# Patient Record
Sex: MALE | Race: WHITE | NOT HISPANIC OR LATINO | Employment: OTHER | URBAN - METROPOLITAN AREA
[De-identification: names, ages, dates, MRNs, and addresses within clinical notes are randomized per-mention and may not be internally consistent; named-entity substitution may affect disease eponyms.]

---

## 2017-02-10 ENCOUNTER — OFFICE VISIT (OUTPATIENT)
Dept: FAMILY MEDICINE | Facility: CLINIC | Age: 77
End: 2017-02-10
Payer: MEDICARE

## 2017-02-10 VITALS
OXYGEN SATURATION: 98 % | HEIGHT: 68 IN | BODY MASS INDEX: 22.19 KG/M2 | SYSTOLIC BLOOD PRESSURE: 140 MMHG | RESPIRATION RATE: 18 BRPM | DIASTOLIC BLOOD PRESSURE: 80 MMHG | WEIGHT: 146.38 LBS | HEART RATE: 61 BPM

## 2017-02-10 DIAGNOSIS — I10 ESSENTIAL HYPERTENSION: Primary | Chronic | ICD-10-CM

## 2017-02-10 PROCEDURE — 1126F AMNT PAIN NOTED NONE PRSNT: CPT | Mod: S$GLB,,, | Performed by: NURSE PRACTITIONER

## 2017-02-10 PROCEDURE — 99213 OFFICE O/P EST LOW 20 MIN: CPT | Mod: S$GLB,,, | Performed by: NURSE PRACTITIONER

## 2017-02-10 PROCEDURE — 1159F MED LIST DOCD IN RCRD: CPT | Mod: S$GLB,,, | Performed by: NURSE PRACTITIONER

## 2017-02-10 PROCEDURE — 1157F ADVNC CARE PLAN IN RCRD: CPT | Mod: S$GLB,,, | Performed by: NURSE PRACTITIONER

## 2017-02-10 PROCEDURE — 99999 PR PBB SHADOW E&M-EST. PATIENT-LVL IV: CPT | Mod: PBBFAC,,, | Performed by: NURSE PRACTITIONER

## 2017-02-10 PROCEDURE — 3077F SYST BP >= 140 MM HG: CPT | Mod: S$GLB,,, | Performed by: NURSE PRACTITIONER

## 2017-02-10 PROCEDURE — 99499 UNLISTED E&M SERVICE: CPT | Mod: S$GLB,,, | Performed by: NURSE PRACTITIONER

## 2017-02-10 PROCEDURE — 3079F DIAST BP 80-89 MM HG: CPT | Mod: S$GLB,,, | Performed by: NURSE PRACTITIONER

## 2017-02-10 PROCEDURE — 1160F RVW MEDS BY RX/DR IN RCRD: CPT | Mod: S$GLB,,, | Performed by: NURSE PRACTITIONER

## 2017-02-10 RX ORDER — LISINOPRIL 20 MG/1
20 TABLET ORAL DAILY
Qty: 30 TABLET | Refills: 0 | Status: SHIPPED | OUTPATIENT
Start: 2017-02-10 | End: 2017-03-15 | Stop reason: SDUPTHER
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
## 2017-02-10 NOTE — MR AVS SNAPSHOT
[facility]  [address] [facility] [address]  [place] [address]  Phone: [phone]  Fax: [phone]                  Mukesh Guevara JrRambo   2/10/2017 1:20 PM   Office Visit    Description:  Male : 1940   Provider:  Aleida Wick NP   Department:  [facility]           Reason for Visit     Hypertension           Diagnoses this Visit        Comments    Essential hypertension    -  Primary            To Do List           Future Appointments        Provider Department Dept Phone    2017 2:00 PM Christina Justin NP [facility] [phone]      Goals (5 Years of Data)              Today    16    Blood Pressure < 130/80   140/80  144/62  136/74      Follow-Up and Disposition     Return in about 1 week (around 2017) for Bp recehck.       These Medications        Disp Refills Start End    lisinopril (PRINIVIL,ZESTRIL) 20 MG tablet 30 tablet 0 2/10/2017     Take 1 tablet (20 mg total) by mouth once daily. - Oral    Pharmacy: [facility]/pharmacy #[id number] - [place], [place] - [address] #: [phone]         [facility] On Call     [facility] On Call Nurse Care Line -  Assistance  Registered nurses in the [facility] provide clinical advisement, health education, appointment booking, and other advisory services.  Call for this free service at [phone].             Medications           Message regarding Medications     Verify the changes and/or additions to your medication regime listed below are the same as discussed with your clinician today.  If any of these changes or additions are incorrect, please notify your healthcare provider.        CHANGE how you are taking these medications     Start Taking Instead of    lisinopril (PRINIVIL,ZESTRIL) 20 MG tablet lisinopril 10 MG tablet    Dosage:  Take 1 tablet (20 mg total) by mouth once daily. Dosage:  TAKE 1 TABLET EVERY DAY    Reason for Change:  " Reorder            Verify that the below list of medications is an accurate representation of the medications you are currently taking.  If none reported, the list may be blank. If incorrect, please contact your healthcare provider. Carry this list with you in case of emergency.           Current Medications     aspirin 81 MG Chew Take 81 mg by mouth once daily.    hydrochlorothiazide (HYDRODIURIL) 25 MG tablet TAKE 1/2 TABLET ONCE A DAY    ketoconazole (NIZORAL) 2 % shampoo Wash hair with medicated shampoo at least 3x/week - let sit on scalp at least 5 minutes prior to rinsing    lisinopril (PRINIVIL,ZESTRIL) 20 MG tablet Take 1 tablet (20 mg total) by mouth once daily.    mouthwashes Soln PHARMACIST:  MIX 50mL Benadryl Elixir; 50mL Viscous Lidocaine; 50mL Nystatin Suspension; 50mL Milk of Magnesia  PATIENT:  Take 5mL by mouth - Swish and swallow - every 4 hours as needed for mouth/throat pain.    pazopanib 200 mg Tab Take 800 mg by mouth once daily.    prasterone, dhea, 50 mg Cap Take by mouth once daily. For testosterone    prochlorperazine (COMPAZINE) 10 MG tablet Take 1 tablet (10 mg total) by mouth every 6 (six) hours as needed.    propranolol (INDERAL LA) 80 MG 24 hr capsule Take 1 capsule (80 mg total) by mouth once daily.    RESVERATROL ORAL Take by mouth.    testosterone undecanoate (AVEED) 750 mg/3 mL (250 mg/mL) Soln Inject 750 mg into the muscle every 10 weeks.    zinc 25 mg Tab Take by mouth once daily. Helps his testosterone           Clinical Reference Information           Your Vitals Were     BP Pulse Resp Height Weight SpO2    140/80 (BP Location: Right arm, Patient Position: Sitting, BP Method: Manual) 61 18 5' 8" (1.727 m) 66.4 kg (146 lb 6.2 oz) 98%    BMI                22.26 kg/m2          Blood Pressure          Most Recent Value    BP  (!)  140/80      Allergies as of 2/10/2017     Doxycycline    Penicillins      Immunizations Administered on Date of Encounter - 2/10/2017     None    "   [facility] Sign-Up     Activating your [facility] account is as easy as 1-2-3!     1) Visit [email or web address], select Sign Up Now, enter this activation code and your date of birth, then select Next.  [email or web address]  Expires: 3/27/2017  2:18 PM      2) Create a username and password to use when you visit [facility] in the future and select a security question in case you lose your password and select Next.    3) Enter your e-mail address and click Sign Up!    Additional Information  If you have questions, please e-mail [email or web address] or call [phone] to talk to our [facility] staff. Remember, [facility] is NOT to be used for urgent needs. For medical emergencies, dial 911.         Language Assistance Services     ATTENTION: Language assistance services are available, free of charge. Please call [phone].      ATENCIÓN: Si habla hansa, tiene a paniagua disposición servicios gratuitos de asistencia lingüística. Llame al [phone].     JONA Ý: N?u b?n nói Ti?ng Vi?t, có các d?ch v? h? tr? ngôn ng? mi?n phí dành cho b?n. G?i s? [phone].         [facility] complies with applicable Federal civil rights laws and does not discriminate on the basis of race, color, national origin, age, disability, or sex.

## 2017-02-17 ENCOUNTER — OFFICE VISIT (OUTPATIENT)
Dept: DERMATOLOGY | Facility: CLINIC | Age: 77
End: 2017-02-17
Payer: MEDICARE

## 2017-02-17 DIAGNOSIS — C44.311 BASAL CELL CARCINOMA OF NOSE: Primary | ICD-10-CM

## 2017-02-17 PROCEDURE — 1126F AMNT PAIN NOTED NONE PRSNT: CPT | Mod: S$GLB,,, | Performed by: DERMATOLOGY

## 2017-02-17 PROCEDURE — 1157F ADVNC CARE PLAN IN RCRD: CPT | Mod: S$GLB,,, | Performed by: DERMATOLOGY

## 2017-02-17 PROCEDURE — 1159F MED LIST DOCD IN RCRD: CPT | Mod: S$GLB,,, | Performed by: DERMATOLOGY

## 2017-02-17 PROCEDURE — 99213 OFFICE O/P EST LOW 20 MIN: CPT | Mod: S$GLB,,, | Performed by: DERMATOLOGY

## 2017-02-17 PROCEDURE — 99999 PR PBB SHADOW E&M-EST. PATIENT-LVL III: CPT | Mod: PBBFAC,,, | Performed by: DERMATOLOGY

## 2017-02-17 PROCEDURE — 1160F RVW MEDS BY RX/DR IN RCRD: CPT | Mod: S$GLB,,, | Performed by: DERMATOLOGY
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
## 2017-02-20 ENCOUNTER — TELEPHONE (OUTPATIENT)
Dept: OTOLARYNGOLOGY | Facility: CLINIC | Age: 77
End: 2017-02-20

## 2017-02-21 ENCOUNTER — TELEPHONE (OUTPATIENT)
Dept: DERMATOLOGY | Facility: CLINIC | Age: 77
End: 2017-02-21

## 2017-03-15 DIAGNOSIS — I10 ESSENTIAL HYPERTENSION: Chronic | ICD-10-CM

## 2017-03-15 RX ORDER — LISINOPRIL 20 MG/1
TABLET ORAL
Qty: 30 TABLET | Refills: 0 | Status: SHIPPED | OUTPATIENT
Start: 2017-03-15 | End: 2017-07-03 | Stop reason: SDUPTHER

## 2017-03-17 ENCOUNTER — OFFICE VISIT (OUTPATIENT)
Dept: OTOLARYNGOLOGY | Facility: CLINIC | Age: 77
End: 2017-03-17
Payer: MEDICARE

## 2017-03-17 VITALS
HEIGHT: 69 IN | BODY MASS INDEX: 21.42 KG/M2 | SYSTOLIC BLOOD PRESSURE: 124 MMHG | HEART RATE: 64 BPM | WEIGHT: 144.63 LBS | DIASTOLIC BLOOD PRESSURE: 74 MMHG

## 2017-03-17 DIAGNOSIS — C44.91 BASAL CELL CARCINOMA OF SKIN: Primary | ICD-10-CM

## 2017-03-17 PROCEDURE — 99999 PR PBB SHADOW E&M-EST. PATIENT-LVL III: CPT | Mod: PBBFAC,,, | Performed by: OTOLARYNGOLOGY

## 2017-03-17 PROCEDURE — 3078F DIAST BP <80 MM HG: CPT | Mod: S$GLB,,, | Performed by: OTOLARYNGOLOGY

## 2017-03-17 PROCEDURE — 1160F RVW MEDS BY RX/DR IN RCRD: CPT | Mod: S$GLB,,, | Performed by: OTOLARYNGOLOGY

## 2017-03-17 PROCEDURE — 3074F SYST BP LT 130 MM HG: CPT | Mod: S$GLB,,, | Performed by: OTOLARYNGOLOGY

## 2017-03-17 PROCEDURE — 99214 OFFICE O/P EST MOD 30 MIN: CPT | Mod: S$GLB,,, | Performed by: OTOLARYNGOLOGY

## 2017-03-17 PROCEDURE — 1126F AMNT PAIN NOTED NONE PRSNT: CPT | Mod: S$GLB,,, | Performed by: OTOLARYNGOLOGY

## 2017-03-17 PROCEDURE — 1159F MED LIST DOCD IN RCRD: CPT | Mod: S$GLB,,, | Performed by: OTOLARYNGOLOGY

## 2017-03-17 PROCEDURE — 1157F ADVNC CARE PLAN IN RCRD: CPT | Mod: S$GLB,,, | Performed by: OTOLARYNGOLOGY
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
## 2017-03-17 NOTE — LETTER
March 17, 2017      Renny Pérez MD  [address]  [facility] [address]           [facility] Otolaryngology  [address] [facility] [address]  [facility] [address]  Phone: [phone]  Fax: [phone]          Patient: Mukesh Guevara Jr.   MR Number: [id number]   YOB: 1940   Date of Visit: 3/17/2017       Dear Dr. Renny Pérez:    Thank you for referring Mukesh Guevara to me for evaluation. Attached you will find relevant portions of my assessment and plan of care.    If you have questions, please do not hesitate to call me. I look forward to following Mukesh Guevara along with you.    Sincerely,    Eddie Chan MD    Enclosure  CC:  No Recipients    If you would like to receive this communication electronically, please contact [email or web address] or [phone] to request more information on [facility] access.    For providers and/or their staff who would like to refer a patient to [facility], please contact us through our one-stop-shop provider referral line, [facility], at [phone].    If you feel you have received this communication in error or would no longer like to receive these types of communications, please e-mail [email or web address]

## 2017-03-17 NOTE — MR AVS SNAPSHOT
[facility] Otolaryngology  [address] [facility] [address]  [place] [address]  Phone: [phone]  Fax: [phone]                  Mukesh Guevara Jr.   3/17/2017 2:00 PM   Office Visit    Description:  Male : 1940   Provider:  Eddie Chan MD   Department:  [facility] Otolaryngology           Reason for Visit     Skin Cancer           Diagnoses this Visit        Comments    Basal cell carcinoma of skin    -  Primary            To Do List           Future Appointments        Provider Department Dept Phone    3/24/2017 2:20 PM Christina Justin NP [facility] [phone]      Goals (5 Years of Data)              Today    2/10/17    12/5/16    Blood Pressure < 130/80   124/74  124/74  124/74      Follow-Up and Disposition     Return if symptoms worsen or fail to improve.      [facility]     [facility] On Call Nurse Care Line -  Assistance  Registered nurses in the [facility] provide clinical advisement, health education, appointment booking, and other advisory services.  Call for this free service at [phone].             Medications           Message regarding Medications     Verify the changes and/or additions to your medication regime listed below are the same as discussed with your clinician today.  If any of these changes or additions are incorrect, please notify your healthcare provider.             Verify that the below list of medications is an accurate representation of the medications you are currently taking.  If none reported, the list may be blank. If incorrect, please contact your healthcare provider. Carry this list with you in case of emergency.           Current Medications     aspirin 81 MG Chew Take 81 mg by mouth once daily.    hydrochlorothiazide (HYDRODIURIL) 25 MG tablet TAKE 1/2 TABLET ONCE A DAY    ketoconazole (NIZORAL) 2 % shampoo Wash hair with medicated shampoo at least 3x/week - let sit on scalp at least 5 minutes prior to rinsing     "lisinopril (PRINIVIL,ZESTRIL) 20 MG tablet TAKE 1 TABLET (20 MG TOTAL) BY MOUTH ONCE DAILY.    mouthwashes Soln PHARMACIST:  MIX 50mL Benadryl Elixir; 50mL Viscous Lidocaine; 50mL Nystatin Suspension; 50mL Milk of Magnesia  PATIENT:  Take 5mL by mouth - Swish and swallow - every 4 hours as needed for mouth/throat pain.    pazopanib 200 mg Tab Take 800 mg by mouth once daily.    prasterone, dhea, 50 mg Cap Take by mouth once daily. For testosterone    prochlorperazine (COMPAZINE) 10 MG tablet Take 1 tablet (10 mg total) by mouth every 6 (six) hours as needed.    RESVERATROL ORAL Take by mouth.    testosterone undecanoate (AVEED) 750 mg/3 mL (250 mg/mL) Soln Inject 750 mg into the muscle every 10 weeks.    zinc 25 mg Tab Take by mouth once daily. Helps his testosterone    propranolol (INDERAL LA) 80 MG 24 hr capsule Take 1 capsule (80 mg total) by mouth once daily.           Clinical Reference Information           Your Vitals Were     BP Pulse Height Weight BMI    124/74 64 5' 8.5" (1.74 m) 65.6 kg (144 lb 10 oz) 21.67 kg/m2      Blood Pressure          Most Recent Value    BP  124/74      Allergies as of 3/17/2017     Doxycycline    Penicillins      Immunizations Administered on Date of Encounter - 3/17/2017     None      Instructions    Dr. Pérez and I will be in touch about the potential for Mohs surgery and reconstruction.        Language Assistance Services     ATTENTION: Language assistance services are available, free of charge. Please call [phone].      ATENCIÓN: Si cliff santo, tiene a paniagua disposición servicios gratuitos de asistencia lingüística. Llame al [phone].     [facility] Ý: N?u b?n nói Ti?ng Vi?t, có các d?ch v? h? tr? ngôn ng? mi?n phí dành cho b?n. G?i s? [phone].         [facility] Ot[facility] complies with applicable Federal civil rights laws and does not discriminate on the basis of race, color, national origin, age, disability, or sex.        "

## 2017-03-18 DIAGNOSIS — R25.1 TREMOR: ICD-10-CM

## 2017-03-21 RX ORDER — PROPRANOLOL HYDROCHLORIDE 80 MG/1
CAPSULE, EXTENDED RELEASE ORAL
Qty: 30 CAPSULE | Refills: 6 | Status: SHIPPED | OUTPATIENT
Start: 2017-03-21 | End: 2017-07-05 | Stop reason: SDUPTHER

## 2017-03-24 ENCOUNTER — OFFICE VISIT (OUTPATIENT)
Dept: FAMILY MEDICINE | Facility: CLINIC | Age: 77
End: 2017-03-24
Payer: MEDICARE

## 2017-03-24 VITALS
TEMPERATURE: 99 F | SYSTOLIC BLOOD PRESSURE: 116 MMHG | HEIGHT: 68 IN | BODY MASS INDEX: 22.05 KG/M2 | HEART RATE: 68 BPM | WEIGHT: 145.5 LBS | RESPIRATION RATE: 16 BRPM | OXYGEN SATURATION: 95 % | DIASTOLIC BLOOD PRESSURE: 74 MMHG

## 2017-03-24 DIAGNOSIS — I10 ESSENTIAL HYPERTENSION: Primary | ICD-10-CM

## 2017-03-24 PROCEDURE — 1159F MED LIST DOCD IN RCRD: CPT | Mod: S$GLB,,, | Performed by: NURSE PRACTITIONER

## 2017-03-24 PROCEDURE — 3078F DIAST BP <80 MM HG: CPT | Mod: S$GLB,,, | Performed by: NURSE PRACTITIONER

## 2017-03-24 PROCEDURE — 1157F ADVNC CARE PLAN IN RCRD: CPT | Mod: S$GLB,,, | Performed by: NURSE PRACTITIONER

## 2017-03-24 PROCEDURE — 3074F SYST BP LT 130 MM HG: CPT | Mod: S$GLB,,, | Performed by: NURSE PRACTITIONER

## 2017-03-24 PROCEDURE — 99213 OFFICE O/P EST LOW 20 MIN: CPT | Mod: S$GLB,,, | Performed by: NURSE PRACTITIONER

## 2017-03-24 PROCEDURE — 99499 UNLISTED E&M SERVICE: CPT | Mod: S$GLB,,, | Performed by: NURSE PRACTITIONER

## 2017-03-24 PROCEDURE — 1160F RVW MEDS BY RX/DR IN RCRD: CPT | Mod: S$GLB,,, | Performed by: NURSE PRACTITIONER

## 2017-03-24 PROCEDURE — 99999 PR PBB SHADOW E&M-EST. PATIENT-LVL III: CPT | Mod: PBBFAC,,, | Performed by: NURSE PRACTITIONER

## 2017-03-24 NOTE — MR AVS SNAPSHOT
[facility]  [address]  [facility] [address]  Phone: [phone]  Fax: [phone]                  Mukesh Guevara    3/24/2017 2:20 PM   Office Visit    Description:  Male : 1940   Provider:  Christina Justin NP   Department:  [facility]           Reason for Visit     Hypertension           Diagnoses this Visit        Comments    Essential hypertension    -  Primary            To Do List           Future Appointments        Provider Department Dept Phone    2017 9:10 AM LAB, [place] [facility] [phone]    2017 7:45 AM Renny Pérez MD [facility] Dermatology [phone]      Goals (5 Years of Data)              Today    3/17/17    2/10/17    Blood Pressure < 130/80   116/74  116/74  116/74      Follow-Up and Disposition     Return in about 2 months (around 2017), or if symptoms worsen or fail to improve.      [facility]     [facility] Nurse Care [facility] -  Assistance  Registered nurses in the [facility] provide clinical advisement, health education, appointment booking, and other advisory services.  Call for this free service at [phone].             Medications           Message regarding Medications     Verify the changes and/or additions to your medication regime listed below are the same as discussed with your clinician today.  If any of these changes or additions are incorrect, please notify your healthcare provider.             Verify that the below list of medications is an accurate representation of the medications you are currently taking.  If none reported, the list may be blank. If incorrect, please contact your healthcare provider. Carry this list with you in case of emergency.           Current Medications     aspirin 81 MG Chew Take 81 mg by mouth once daily.    hydrochlorothiazide (HYDRODIURIL) 25 MG tablet TAKE 1/2 TABLET ONCE A DAY    ketoconazole (NIZORAL) 2 %  "shampoo Wash hair with medicated shampoo at least 3x/week - let sit on scalp at least 5 minutes prior to rinsing    lisinopril (PRINIVIL,ZESTRIL) 20 MG tablet TAKE 1 TABLET (20 MG TOTAL) BY MOUTH ONCE DAILY.    mouthwashes Soln PHARMACIST:  MIX 50mL Benadryl Elixir; 50mL Viscous Lidocaine; 50mL Nystatin Suspension; 50mL Milk of Magnesia  PATIENT:  Take 5mL by mouth - Swish and swallow - every 4 hours as needed for mouth/throat pain.    pazopanib 200 mg Tab Take 800 mg by mouth once daily.    prochlorperazine (COMPAZINE) 10 MG tablet Take 1 tablet (10 mg total) by mouth every 6 (six) hours as needed.    propranolol (INDERAL LA) 80 MG 24 hr capsule TAKE 1 CAPSULE (80 MG TOTAL) BY MOUTH ONCE DAILY.    RESVERATROL ORAL Take by mouth.    zinc 25 mg Tab Take by mouth once daily. Helps his testosterone    prasterone, dhea, 50 mg Cap Take by mouth once daily. For testosterone    testosterone undecanoate (AVEED) 750 mg/3 mL (250 mg/mL) Soln Inject 750 mg into the muscle every 10 weeks.           Clinical Reference Information           Your Vitals Were     BP Pulse Temp Resp Height Weight    116/74 68 98.9 °F (37.2 °C) (Oral) 16 5' 8" (1.727 m) 66 kg (145 lb 8.1 oz)    SpO2 BMI             95% 22.12 kg/m2         Blood Pressure          Most Recent Value    BP  116/74      Allergies as of 3/24/2017     Doxycycline    Penicillins      Immunizations Administered on Date of Encounter - 3/24/2017     None      Orders Placed During Today's Visit     Future Labs/Procedures Expected by Expires    CBC auto differential  3/24/2017 5/23/2018    Comprehensive metabolic panel  3/24/2017 6/22/2017    Lipid panel  3/24/2017 5/23/2018    TSH  3/24/2017 5/23/2018      Language Assistance Services     ATTENTION: Language assistance services are available, free of charge. Please call [phone].      ATENCIÓN: Si habla español, tiene a paniagua disposición servicios gratuitos de asistencia lingüística. Llame al [phone].     CHÚ Ý: N?u " b?n nói Ti?ng Vi?t, có các d?ch v? h? tr? ngôn ng? mi?n phí dành cho b?n. G?i s? [phone].         [facility] complies with applicable Federal civil rights laws and does not discriminate on the basis of race, color, national origin, age, disability, or sex.

## 2017-03-24 NOTE — PROGRESS NOTES
Subjective:       Patient ID: Mukesh Guevara Jr. is a 77 y.o. male.    Chief Complaint: Hypertension  He was last seen in primary care by HARRY Wick NP on 02/10/2017. This is his first time seeing me in the clinic.  HPI  Vitals:    03/24/17 1428   BP: 116/74   Pulse: 68   Resp: 16   Temp: 98.9 °F (37.2 °C)     BP Readings from Last 3 Encounters:   03/24/17 116/74   03/17/17 124/74   02/10/17 (!) 140/80     Review of Systems  States he is taking his blood pressure medication daily as ordered.  Objective:      Physical Exam   Constitutional: He is oriented to person, place, and time. Vital signs are normal. He appears well-developed and well-nourished.   HENT:   Head: Normocephalic and atraumatic.   Right Ear: External ear normal.   Left Ear: External ear normal.   Nose:       Mouth/Throat: Uvula is midline, oropharynx is clear and moist and mucous membranes are normal.       Eyes: Lids are normal.   Neck: Normal range of motion. Neck supple.   Cardiovascular: Normal rate and regular rhythm.    Pulmonary/Chest: Effort normal and breath sounds normal.   Abdominal: Soft.   Lymphadenopathy:        Head (right side): No submental, no submandibular, no tonsillar, no preauricular, no posterior auricular and no occipital adenopathy present.        Head (left side): No submental, no submandibular, no tonsillar, no preauricular, no posterior auricular and no occipital adenopathy present.   Neurological: He is alert and oriented to person, place, and time.   Skin: Skin is warm and dry. Lesion noted.        Psychiatric: He has a normal mood and affect. His speech is normal and behavior is normal. Judgment and thought content normal. Cognition and memory are normal.   Nursing note and vitals reviewed.      Assessment & Plan:       Essential hypertension  -     Lipid panel; Future; Expected date: 3/24/17  -     CBC auto differential; Future; Expected date: 3/24/17  -     Comprehensive metabolic panel; Future; Expected date:  3/24/17  -     TSH; Future; Expected date: 3/24/17    No changes to his medications today. Continue same dosage.      Return in about 2 months (around 5/24/2017), or if symptoms worsen or fail to improve.  Will follow up with Dr. Newman 2 months  Not interested in Zoster vaccine at this time

## 2017-03-31 DIAGNOSIS — C44.91 BASAL CELL CARCINOMA OF SKIN: Primary | ICD-10-CM

## 2017-03-31 RX ORDER — LIDOCAINE HYDROCHLORIDE 10 MG/ML
1 INJECTION, SOLUTION EPIDURAL; INFILTRATION; INTRACAUDAL; PERINEURAL ONCE
Status: CANCELLED | OUTPATIENT
Start: 2017-03-31 | End: 2017-03-31

## 2017-04-24 ENCOUNTER — TELEPHONE (OUTPATIENT)
Dept: DERMATOLOGY | Facility: CLINIC | Age: 77
End: 2017-04-24

## 2017-05-01 ENCOUNTER — TELEPHONE (OUTPATIENT)
Dept: DERMATOLOGY | Facility: CLINIC | Age: 77
End: 2017-05-01

## 2017-05-01 NOTE — TELEPHONE ENCOUNTER
----- Message from Tamara De La Garza sent at 5/1/2017 12:32 PM CDT -----  Contact: call pt wed afternoon [phone]  Did not hear from dr claros nurse,,, he is to see a plastic surgeon dr moreno, or dr lynch.. Please call pt . No appts in computer

## 2017-05-16 ENCOUNTER — TELEPHONE (OUTPATIENT)
Dept: DERMATOLOGY | Facility: CLINIC | Age: 77
End: 2017-05-16

## 2017-06-01 NOTE — PROGRESS NOTES
ALLERGIES:   Doxycycline and Penicillins      Current Outpatient Prescriptions:     aspirin 81 MG Chew, Take 81 mg by mouth once daily., Disp: , Rfl:     hydrochlorothiazide (HYDRODIURIL) 25 MG tablet, TAKE 1/2 TABLET ONCE A DAY, Disp: 45 tablet, Rfl: 3    ketoconazole (NIZORAL) 2 % shampoo, Wash hair with medicated shampoo at least 3x/week - let sit on scalp at least 5 minutes prior to rinsing, Disp: 120 mL, Rfl: 5    lisinopril (PRINIVIL,ZESTRIL) 20 MG tablet, TAKE 1 TABLET (20 MG TOTAL) BY MOUTH ONCE DAILY., Disp: 30 tablet, Rfl: 0    mouthwashes Soln, PHARMACIST:  MIX 50mL Benadryl Elixir; 50mL Viscous Lidocaine; 50mL Nystatin Suspension; 50mL Milk of Magnesia PATIENT:  Take 5mL by mouth - Swish and swallow - every 4 hours as needed for mouth/throat pain., Disp: 200 mL, Rfl: 6    prasterone, dhea, 50 mg Cap, Take by mouth once daily. For testosterone, Disp: , Rfl:     propranolol (INDERAL LA) 80 MG 24 hr capsule, TAKE 1 CAPSULE (80 MG TOTAL) BY MOUTH ONCE DAILY., Disp: 30 capsule, Rfl: 6    RESVERATROL ORAL, Take by mouth., Disp: , Rfl:     testosterone undecanoate (AVEED) 750 mg/3 mL (250 mg/mL) Soln, Inject 750 mg into the muscle every 10 weeks., Disp: 1 vial, Rfl: 0    zinc 25 mg Tab, Take by mouth once daily. Helps his testosterone, Disp: , Rfl:   -------------------------------------------------------------  PROCEDURE: Mohs' Micrographic Surgery    SITE: left supratip of the nose    INDICATION: basal cell carcinoma in an area at increased risk of recurrence    CASE NUMBER: [id number]      ANESTHETIC: 2.5 mL 1.5% Lidocaine with Epinephrine 1:200,000    SURGICAL PREP: Ethanol and Betadine    SURGEON: Renny Pérez MD    ASSISTANTS: Nico Rojo CST     PREOPERATIVE DIAGNOSIS: basal cell carcinoma    POSTOPERATIVE DIAGNOSIS: basal cell carcinoma    PATHOLOGIC DIAGNOSIS: basal cell carcinoma    STAGES OF MOHS' SURGERY PERFORMED: one    TUMOR-FREE PLANE ACHIEVED: yes    HEMOSTASIS: Hyfrecation      SPECIMENS: one (one in stage A)    INITIAL LESION SIZE: 1.5 x 1.7 cm    FINAL DEFECT SIZE: 1.8 x 1.8 cm    WOUND REPAIR/DISPOSITION: see below    NARRATIVE:    The patient is a 77 y.o.male referred by Rebecca Mace MD with a history of cancer on the nose which was biopsied - pathology accession #[id number], [facility]. Findings revealed basal cell carcinoma. He has a prior history of a surgery to this area by Dr. Menjivar in October of 2014 for a basal cell carcinoma. Examination revealed an ill-defined pearly/sclerotic plaque with focal ulceration on the left nose tip at the site of prior biopsy, which was confirmed by reference to the photograph taken at the previous patient visit. In light of the nature of this tumor and the location on the nose, Mohs' micrographic surgery was thought to be the most appropriate management choice, and this diagnosis is appropriate for treatment by Mohs' micrographic surgery.  I discussed it with the patient and he fully understands the aims, risks, alternatives, and possible complications, and elects to proceed.  There are no medical or surgical contraindications to the procedure.     Of note, Mr. Guevara has an underlying metastatic renal cell carcinoma, as previously documented in his chart    A signed informed consent was obtained.    PROCEDURE:  The patient was placed in the semi-recumbent position on the operating table in the Mohs' Surgery Suite. The area in question was thoroughly prepped with ethanol and Betadine. A sterile surgical marker was used to outline the clinically apparent margins of the involved area, and a narrow margin of normal-appearing skin. Reference marks were made at the periphery of the outlined area with the surgical marker. The proposed area of excision was measured and photographed. Local anesthesia of 1.5% Lidocaine with 1:200,000 epinephrine was administered.  The total volume of anesthetic used throughout this portion of the procedure  "was as documented above. The area was prepared and draped in the standard manner. All of the grossly identifiable area of clinically abnormal tissue and an underlying/peripheral layer was taken and processed by the Mohs' technique.  Hemostasis was obtained with the hyfrecator. Tissue was taken from any areas of residual marginal involvement (if present) and processed by the Mohs' technique in as many stages as needed until a tumor-free plane was achieved.    Colors of inks used in the reference nicks at epidermal margins (if present) and/or inking of non-epithelial edges, if applicable, is represented on the Mohs map as follows: solid lines represent red ink, dots represent blue ink, jagged lines represent black ink, curlicues represent green ink, "xxx" represents yellow ink.    The first Mohs' layer consisted of one section(s) with 9 slide(s) evaluated. Histology of the specimen(s) showed no residual tumor on multiple initial cuts; on the later cuts, an area of infiltrative basal cell carcinoma with some foci showing squamous differentiation to the inferior deep margin of the specimen became evident. Actual surgical margins were assessed as clear.    A total of one section(s) and 9 slide(s) were examined under the microscope via the Mohs technique.  A cancer free plane was reached after layer number one. Defect final size was as noted above.     The wound was covered with a nonadherent dressing between stages, and the patient allowed to wait in the waiting area during these periods. The final defect was photographed at the completion of the Mohs' procedure.    The patient was returned to the procedure room following completion of the Mohs' procedure and final slide review. He is scheduled to see Jameson Bauer MD for closure of the defect on Monday as previously arranged.    Final dressing consisted of Telfa, gauze and tape.    Estimated blood loss for the total procedure was less than 5 mL.    Total operative time " including tissue processing in the Mohs' laboratory and microscopic Mohs' frozen section slide review was 1 hour(s). Verbal and written wound care instructions were given to the patient, and he expressed understanding of these instructions. The patient tolerated the procedure well and left the operating room in good condition; he is to return in several weeks for followup.     Dr. Pérez's cell phone number was given to the patient with instructions to call prn with any problems.    Addendum:   He also complained of another skin growth  Location: under left arm  Duration: about 2 months  Quality: not tender    ROS:  : has history of renal carcinoma with lung metastases    Exam: there is an approximately 1.3 cm raised, dome-shaped, non-tender, red tumor to the skin of the left chest wall inferior to the axilla; this feels solid on palpation, with no underlying palpable mass    Assess: clinically most compatible with a metastatic nodule from his renal carcinoma    Plan: I discussed with him my impression regarding the lesion. I recommended that we defer any treatment for the moment so as not to interfere with his repair by Dr. Bauer next week. I subsequently spoke to Dr. Bauer by phone and we discussed this lesion. He may perform excisional biopsy at the time of his surgery next week.

## 2017-06-02 ENCOUNTER — PROCEDURE VISIT (OUTPATIENT)
Dept: DERMATOLOGY | Facility: CLINIC | Age: 77
End: 2017-06-02
Payer: MEDICARE

## 2017-06-02 VITALS
WEIGHT: 145 LBS | SYSTOLIC BLOOD PRESSURE: 141 MMHG | HEIGHT: 68 IN | DIASTOLIC BLOOD PRESSURE: 83 MMHG | HEART RATE: 69 BPM | BODY MASS INDEX: 21.98 KG/M2

## 2017-06-02 DIAGNOSIS — C44.311 BASAL CELL CARCINOMA OF NOSE: Primary | ICD-10-CM

## 2017-06-02 PROCEDURE — 99499 UNLISTED E&M SERVICE: CPT | Mod: S$GLB,,, | Performed by: DERMATOLOGY

## 2017-06-02 PROCEDURE — 17311 MOHS 1 STAGE H/N/HF/G: CPT | Mod: S$GLB,,, | Performed by: DERMATOLOGY

## 2017-06-05 ENCOUNTER — TELEPHONE (OUTPATIENT)
Dept: FAMILY MEDICINE | Facility: CLINIC | Age: 77
End: 2017-06-05

## 2017-06-05 NOTE — TELEPHONE ENCOUNTER
Attempted to call pt left message to call clinic in regards to his surgery that's due 6/6/2017 at [facility], nasal defect. Please advise.

## 2017-06-06 NOTE — TELEPHONE ENCOUNTER
----- Message from Jumana Mota sent at 6/6/2017  8:25 AM CDT -----  Patient states he is returning office call. Please call back with details at [phone].

## 2017-07-03 DIAGNOSIS — R25.1 TREMOR: ICD-10-CM

## 2017-07-03 DIAGNOSIS — I10 ESSENTIAL HYPERTENSION: Chronic | ICD-10-CM

## 2017-07-03 RX ORDER — LISINOPRIL 20 MG/1
TABLET ORAL
Qty: 30 TABLET | Refills: 0 | Status: SHIPPED | OUTPATIENT
Start: 2017-07-03 | End: 2017-08-28 | Stop reason: SDUPTHER

## 2017-07-05 RX ORDER — PROPRANOLOL HYDROCHLORIDE 80 MG/1
CAPSULE, EXTENDED RELEASE ORAL
Qty: 90 CAPSULE | Refills: 1 | Status: SHIPPED | OUTPATIENT
Start: 2017-07-05

## 2017-08-07 ENCOUNTER — HOSPITAL ENCOUNTER (OUTPATIENT)
Dept: RADIOLOGY | Facility: HOSPITAL | Age: 77
Discharge: HOME | End: 2017-08-07
Attending: NURSE PRACTITIONER
Payer: MEDICARE

## 2017-08-07 ENCOUNTER — OFFICE VISIT (OUTPATIENT)
Dept: FAMILY MEDICINE | Facility: CLINIC | Age: 77
End: 2017-08-07
Payer: MEDICARE

## 2017-08-07 VITALS
DIASTOLIC BLOOD PRESSURE: 83 MMHG | HEIGHT: 68 IN | SYSTOLIC BLOOD PRESSURE: 158 MMHG | WEIGHT: 144.81 LBS | BODY MASS INDEX: 21.95 KG/M2 | HEART RATE: 68 BPM

## 2017-08-07 DIAGNOSIS — I70.0 AORTIC ATHEROSCLEROSIS: Chronic | ICD-10-CM

## 2017-08-07 DIAGNOSIS — M25.561 CHRONIC PAIN OF RIGHT KNEE: ICD-10-CM

## 2017-08-07 DIAGNOSIS — Z00.00 ENCOUNTER FOR PREVENTIVE HEALTH EXAMINATION: Primary | ICD-10-CM

## 2017-08-07 DIAGNOSIS — C64.9 KIDNEY CANCER, PRIMARY, WITH METASTASIS FROM KIDNEY TO OTHER SITE, UNSPECIFIED LATERALITY: Chronic | ICD-10-CM

## 2017-08-07 DIAGNOSIS — Z86.010 PERSONAL HISTORY OF COLONIC POLYPS: ICD-10-CM

## 2017-08-07 DIAGNOSIS — G89.29 CHRONIC PAIN OF RIGHT KNEE: ICD-10-CM

## 2017-08-07 DIAGNOSIS — I10 ESSENTIAL HYPERTENSION: Chronic | ICD-10-CM

## 2017-08-07 DIAGNOSIS — Z85.828 HISTORY OF BASAL CELL CARCINOMA: ICD-10-CM

## 2017-08-07 DIAGNOSIS — Z85.528 PERSONAL HISTORY OF RENAL CANCER: Chronic | ICD-10-CM

## 2017-08-07 DIAGNOSIS — Z72.0 SMOKELESS TOBACCO USE: ICD-10-CM

## 2017-08-07 DIAGNOSIS — C78.00 MALIGNANT NEOPLASM METASTATIC TO LUNG, UNSPECIFIED LATERALITY: ICD-10-CM

## 2017-08-07 PROCEDURE — G0439 PPPS, SUBSEQ VISIT: HCPCS | Mod: S$GLB,,, | Performed by: NURSE PRACTITIONER

## 2017-08-07 PROCEDURE — 99999 PR PBB SHADOW E&M-EST. PATIENT-LVL IV: CPT | Mod: PBBFAC,,, | Performed by: NURSE PRACTITIONER

## 2017-08-07 PROCEDURE — 73560 X-RAY EXAM OF KNEE 1 OR 2: CPT | Mod: 26,59,LT, | Performed by: RADIOLOGY

## 2017-08-07 PROCEDURE — 73562 X-RAY EXAM OF KNEE 3: CPT | Mod: 26,RT,, | Performed by: RADIOLOGY

## 2017-08-07 PROCEDURE — 73560 X-RAY EXAM OF KNEE 1 OR 2: CPT | Mod: TC,PO,LT

## 2017-08-07 NOTE — PATIENT INSTRUCTIONS
Counseling and Referral of Other Preventative  (Italic type indicates deductible and co-insurance are waived)    Patient Name: Mukesh Guevara  Today's Date: 8/7/2017      SERVICE LIMITATIONS RECOMMENDATION    Vaccines    · Pneumococcal (once after 65)    · Influenza (annually)    · Hepatitis B (if medium/high risk)    · Prevnar 13      Hepatitis B medium/high risk factors:       - End-stage renal disease       - Hemophiliacs who received Factor VII or         IX concentrates       - Clients of institutions for the mentally             retarded       - Persons who live in the same house as          a HepB carrier       - Homosexual men       - Illicit injectable drug abusers     Pneumococcal: Done, no repeat necessary     Influenza: N/A     Hepatitis B: N/A     Prevnar 13: Done, no repeat necessary    Prostate cancer screening (annually to age 75)     Prostate specific antigen (PSA) Shared decision making with Provider. Sometimes a co-pay may be required if the patient decides to have this test. The USPSTF no longer recommends prostate cancer screening routinely in medicine: not to follow    Colorectal cancer screening (to age 75)    · Fecal occult blood test (annual)  · Flexible sigmoidoscopy (5y)  · Screening colonoscopy (10y)  · Barium enema   N/A    Diabetes self-management training (no USPSTF recommendations)  Requires referral by treating physician for patient with diabetes or renal disease. 10 hours of initial DSMT sessions of no less than 30 minutes each in a continuous 12-month period. 2 hours of follow-up DSMT in subsequent years.  N/A    Glaucoma screening (no USPSTF recommendation)  Diabetes mellitus, family history   , age 50 or over    American, age 65 or over  Recommend follow up with eye care professional regularly    Medical nutrition therapy for diabetes or renal disease (no recommended schedule)  Requires referral by treating physician for patient with diabetes or renal  disease or kidney transplant within the past 3 years.  Can be provided in same year as diabetes self-management training (DSMT), and CMS recommends medical nutrition therapy take place after DSMT. Up to 3 hours for initial year and 2 hours in subsequent years.  N/A    Cardiovascular screening blood tests (every 5 years)  · Fasting lipid panel  Order as a panel if possible  Last done 2014, recommend to repeat every 5  years    Diabetes screening tests (at least every 3 years, Medicare covers annually or at 6-month intervals for prediabetic patients)  · Fasting blood sugar (FBS) or glucose tolerance test (GTT)  Patient must be diagnosed with one of the following:       - Hypertension       - Dyslipidemia       - Obesity (BMI 30kg/m2)       - Previous elevated impaired FBS or GTT       ... or any two of the following:       - Overweight (BMI 25 but <30)       - Family history of diabetes       - Age 65 or older       - History of gestational diabetes or birth of baby weighing more than 9 pounds  Last done 2016, recommend to repeat every 1  years    Abdominal aortic aneurysm screening (once)  · Sonogram   Limited to patients who meet one of the following criteria:       - Men who are 65-75 years old and have smoked more than 100 cigarette in their lifetime       - Anyone with a family history of abdominal aortic aneurysm       - Anyone recommended for screening by the USPSTF  N/A    HIV screening (annually for increased risk patients)  · HIV-1 and HIV-2 by EIA, or GORAN, rapid antibody test or oral mucosa transudate  Patients must be at increased risk for HIV infection per USPSTF guidelines or pregnant. Tests covered annually for patient at increased risk or as requested by the patient. Pregnant patients may receive up to 3 tests during pregnancy.  Risks discussed, screening is not recommended    Smoking cessation counseling (up to 8 sessions per year)  Patients must be asymptomatic of tobacco-related conditions to  receive as a preventative service.  Counseled for 3-10 minutes.    Subsequent annual wellness visit  At least 12 months since last AWV  Return in one year     The following information is provided to all patients.  This information is to help you find resources for any of the problems found today that may be affecting your health:                Living healthy guide: www.Counts include 234 beds at the [facility]      Understanding Diabetes: www.diabetes.org      Eating healthy: www.cdc.gov/healthyweight      CDC home safety checklist: www.cdc.gov/steadi/patient.html      Agency on Aging: www.goea.[place]      Alcoholics anonymous (AA): www.aa.org      Physical Activity: www.bakari.nih.gov/lk3pexa      Tobacco use: www.quitwithusla.org

## 2017-08-08 ENCOUNTER — TELEPHONE (OUTPATIENT)
Dept: FAMILY MEDICINE | Facility: CLINIC | Age: 77
End: 2017-08-08

## 2017-08-08 NOTE — TELEPHONE ENCOUNTER
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  This is long-standing issue.    Before we address this:    I do need to know what's happening with his kidney cancer.  Who was caring for this, as he had surgery, how is he doing?    As of this moment, I have no paper work for him.

## 2017-08-08 NOTE — TELEPHONE ENCOUNTER
----- Message from Waveseer sent at 8/8/2017  1:22 PM CDT -----  Pt is here today wanting to see if he could an earlier appt than 10/9 speaking with daughter Sadia, said spoke with an nurse -pa  already tht told her tht he needs to see Dr. Newman due to the reason xrys of the knee. Also dropping off [facility] to [facility] , Tht I already put in his box. Pt also needs an handicap tag

## 2017-08-10 NOTE — TELEPHONE ENCOUNTER
I spoke with pts daughter. She knew about xray results. She wanted to know if he needs to get MRI of knee?  She said he did not have surgery, he is doing all natural, cryotherapy, hyperbaric therapy, seems do be doing well.   She wanted to know if you got he forms for the [facility] to be filled out. She dropped it off 2 days ago.   He would also like to get a handicap form filled out.

## 2017-08-15 NOTE — PROGRESS NOTES
"Mukesh Guevara presented for a  Medicare AWV and comprehensive Health Risk Assessment today. The following components were reviewed and updated:    · Medical history  · Family History  · Social history  · Allergies and Current Medications  · Health Risk Assessment  · Health Maintenance  · Care Team     ** See Completed Assessments for Annual Wellness Visit within the encounter summary.**       The following assessments were completed:  · Living Situation  · CAGE  · Depression Screening  · Timed Get Up and Go  · Whisper Test  · Cognitive Function Screening  · Nutrition Screening  · ADL Screening  · PAQ Screening    Reports fall approx 4 months ago and injured r knee.  Requests further eval  Has not taken any bp meds this am    Is following with a physician in [place] for cancer tx.  Is adhering to low sugar and alkaline diet.  Undergoing cryotherapy and hyperbaric chamber treatments.  Pt and family are pleased with current tx regimen and are currently not interested in f/u with [facility].  However, pt is willing to f/u with pulm    Vitals:    08/07/17 1001   BP: (!) 158/83   BP Location: Left arm   Patient Position: Sitting   Pulse: 68   Weight: 65.7 kg (144 lb 13.5 oz)   Height: 5' 8" (1.727 m)     Body mass index is 22.02 kg/m².  Physical Exam   Constitutional: He is oriented to person, place, and time. No distress.   HENT:   Head: Normocephalic and atraumatic.   Eyes: Pupils are equal, round, and reactive to light.   Neck: Normal range of motion.   Cardiovascular: Normal rate.    Pulmonary/Chest: Effort normal.   Musculoskeletal: He exhibits no edema.        Right knee: He exhibits decreased range of motion. Tenderness found.   Neurological: He is alert and oriented to person, place, and time.   Skin: No rash noted. He is not diaphoretic. No erythema.   Psychiatric: He has a normal mood and affect. His behavior is normal.   Vitals reviewed.        Diagnoses and health risks identified today and associated " recommendations/orders:    1. Encounter for preventive health examination  Recommend yearly HRA visit    2. Aortic atherosclerosis  Continue to monitor lipids  Followed by Bernard Newman Jr, MD .       3. Malignant neoplasm metastatic to lung, unspecified laterality    Followed by physician in [place].     - Ambulatory referral to Pulmonology    4. Kidney cancer, primary, with metastasis from kidney to other site, unspecified laterality  Current tx includes low sugar/alkaline diet;  Hyperbaric chamber and cryotherapy  Followed by holistic medicine in [place].       5. Chronic pain of right knee  Will further eval with xr  Recommend schedule f/u with Bernard Newman Jr, MD   Followed by Bernard Newman Jr, MD .       6. Essential hypertension  Stable.   Taking acei  Followed by Bernard Newman Jr, MD .       7. History of basal cell carcinoma  Follow derm recs  Followed by [facility].       8. Personal history of renal cancer  Stable.     Followed by holistic medicine.       9. Personal history of colonic polyps  Stable.     Followed by Bernard Newman Jr, MD .       10. Smokeless tobacco use  Stable.   Counseling provided  Followed by Bernard Newman Jr, MD .         Provided Mukesh with a 5-10 year written screening schedule and personal prevention plan. Recommendations were developed using the USPSTF age appropriate recommendations. Education, counseling, and referrals were provided as needed. After Visit Summary printed and given to patient which includes a list of additional screenings\tests needed.    Return in about 1 year (around 8/7/2018).    Britany Nathan NP

## 2017-08-28 DIAGNOSIS — I10 ESSENTIAL HYPERTENSION: Chronic | ICD-10-CM

## 2017-08-28 RX ORDER — LISINOPRIL 20 MG/1
TABLET ORAL
Qty: 30 TABLET | Refills: 0 | Status: SHIPPED | OUTPATIENT
Start: 2017-08-28 | End: 2017-09-29 | Stop reason: SDUPTHER

## 2017-08-29 ENCOUNTER — OFFICE VISIT (OUTPATIENT)
Dept: FAMILY MEDICINE | Facility: CLINIC | Age: 77
End: 2017-08-29
Payer: MEDICARE

## 2017-08-29 VITALS
RESPIRATION RATE: 17 BRPM | OXYGEN SATURATION: 98 % | WEIGHT: 134.94 LBS | HEART RATE: 96 BPM | DIASTOLIC BLOOD PRESSURE: 70 MMHG | HEIGHT: 68 IN | SYSTOLIC BLOOD PRESSURE: 118 MMHG | BODY MASS INDEX: 20.45 KG/M2

## 2017-08-29 DIAGNOSIS — I10 ESSENTIAL HYPERTENSION: Chronic | ICD-10-CM

## 2017-08-29 DIAGNOSIS — C64.9 KIDNEY CANCER, PRIMARY, WITH METASTASIS FROM KIDNEY TO OTHER SITE, UNSPECIFIED LATERALITY: Primary | Chronic | ICD-10-CM

## 2017-08-29 DIAGNOSIS — M25.561 CHRONIC PAIN OF RIGHT KNEE: ICD-10-CM

## 2017-08-29 DIAGNOSIS — G89.29 CHRONIC PAIN OF RIGHT KNEE: ICD-10-CM

## 2017-08-29 PROCEDURE — 99999 PR PBB SHADOW E&M-EST. PATIENT-LVL III: CPT | Mod: PBBFAC,,, | Performed by: EMERGENCY MEDICINE

## 2017-08-29 PROCEDURE — 99214 OFFICE O/P EST MOD 30 MIN: CPT | Mod: S$GLB,,, | Performed by: EMERGENCY MEDICINE

## 2017-08-29 PROCEDURE — 3008F BODY MASS INDEX DOCD: CPT | Mod: S$GLB,,, | Performed by: EMERGENCY MEDICINE

## 2017-08-29 PROCEDURE — 3074F SYST BP LT 130 MM HG: CPT | Mod: S$GLB,,, | Performed by: EMERGENCY MEDICINE

## 2017-08-29 PROCEDURE — 1159F MED LIST DOCD IN RCRD: CPT | Mod: S$GLB,,, | Performed by: EMERGENCY MEDICINE

## 2017-08-29 PROCEDURE — 3078F DIAST BP <80 MM HG: CPT | Mod: S$GLB,,, | Performed by: EMERGENCY MEDICINE

## 2017-08-29 RX ORDER — DICLOFENAC SODIUM 75 MG/1
75 TABLET, DELAYED RELEASE ORAL 2 TIMES DAILY
Refills: 0 | COMMUNITY
Start: 2017-08-08

## 2017-08-29 NOTE — PROGRESS NOTES
Subjective:   THIS NOTE IS DONE WITH VOICE RECOGNITION        Patient ID: Mukesh Guevara Jr. is a 77 y.o. male.    Chief Complaint: Hypertension (follow up)      HPI     He presents today for follow-up of his blood pressure.  It's been 18 months since I last saw him.    He does have a well established kidney cancer.  This was identified on CT scan in early 2016.      PET Scan 3/16    Impression       1.  2 mildly hypermetabolic right lower lobe pulmonary masses which are suspicious for metastatic malignancy.  2.  Poorly metabolic and hypermetabolic lymph nodes in the neck, chest, and abdomen which are suspicious for metastatic disease.  Other differential considerations include infectious, inflammatory, and granulomatous diseases (sarcoidosis).     He has elected to treat this with alternative therapies.  He's currently getting vitamin B12 therapy vitamin C therapy and cryotherapy.  The description of cryotherapy is a 3 minute session in a chamber where the temperature drops.  I'm not aware that he is getting any type of infusion or inhaling any type of nitrogen.  This is being managed by a physician out of [place] with a local therapies in [place].  He has elected not to pursue interventions with oncology.  He did have a biopsy of the right preauricular node that was positive for metastatic cancer.  Again he elected not to pursue more traditional therapies.    He is not experiencing any pain.  He denies any hematuria.  He has had significant weight loss.    He is not experiencing any pulmonary symptomatology, but is contemplating going to pulmonary for advice around the pulmonary metastases that he has.  Unless he is anticipating other more traditional therapies, I did not encourage him to spend limited resources on this consultation.    He did go to [facility].  He had a brain scan, type unknown, chest x-ray, blood work, all of these being reported as negative to the patient.    He does mention  significant right knee pain.  This is predominantly medial.    8/7/2017 knee xray    Impression       1.  Tricompartmental knee osteoarthritis bilaterally including moderate right medial and patellofemoral compartmental disease. A small right knee joint effusion is also noted.     Hypertension is well-controlled today.  He does not monitor his pressure at home on a regular basis.    BP Readings from Last 3 Encounters:   08/29/17 118/70   08/07/17 (!) 158/83   06/02/17 (!) 141/83     He is not experiencing any chest pain or shortness of breath.    He brings in 2 forms today.  One form for parking permit the other form from the [employer] seeking additional services.  He is with his daughter.  The [facility] was [facility] services.  [facility].  At this time, there is significant financial limitations, which may be influencing his decision as to move towards alternative therapies.      Current Outpatient Prescriptions   Medication Sig Dispense Refill    aspirin 81 MG Chew Take 81 mg by mouth once daily.      hydrochlorothiazide (HYDRODIURIL) 25 MG tablet TAKE 1/2 TABLET ONCE A DAY 45 tablet 3    ketoconazole (NIZORAL) 2 % shampoo Wash hair with medicated shampoo at least 3x/week - let sit on scalp at least 5 minutes prior to rinsing 120 mL 5    lisinopril (PRINIVIL,ZESTRIL) 20 MG tablet TAKE 1 TABLET (20 MG TOTAL) BY MOUTH ONCE DAILY. 30 tablet 0    propranolol (INDERAL LA) 80 MG 24 hr capsule TAKE 1 CAPSULE (80 MG TOTAL) BY MOUTH ONCE DAILY. 90 capsule 1     No current facility-administered medications for this visit.          Review of Systems   Constitutional: Positive for unexpected weight change. Negative for activity change, chills and fever.   HENT: Negative.    Eyes: Negative for discharge and itching.   Respiratory: Negative for cough, choking, chest tightness, shortness of breath and wheezing.    Cardiovascular: Negative  for chest pain, palpitations and leg swelling.   Gastrointestinal: Negative for abdominal distention and blood in stool.   Genitourinary: Negative for difficulty urinating, dysuria, flank pain, hematuria and urgency.   Musculoskeletal: Positive for arthralgias, back pain and joint swelling.   Neurological: Negative for dizziness, numbness and headaches.   Psychiatric/Behavioral: Negative for confusion, dysphoric mood and hallucinations.       Objective:      Physical Exam   Constitutional: He is oriented to person, place, and time. He appears well-developed and well-nourished. No distress.   HENT:   Head: Normocephalic and atraumatic.   Right Ear: External ear normal.   Left Ear: External ear normal.   Nose: Nose normal.   Mouth/Throat: Oropharynx is clear and moist. No oropharyngeal exudate.   Eyes: Conjunctivae and EOM are normal. Pupils are equal, round, and reactive to light. No scleral icterus.   Neck: Normal range of motion. Neck supple. No thyromegaly present.   Cardiovascular: Normal rate, regular rhythm and normal heart sounds.    Pulmonary/Chest: Effort normal and breath sounds normal. He has no wheezes. He has no rales.   Abdominal: Soft. Bowel sounds are normal. He exhibits no distension. There is no tenderness.   Ménière's palpation of the abdomen reveals no obvious tenderness.  Difficult to feel any evidence of renal mass.    Musculoskeletal: Normal range of motion. He exhibits no edema or tenderness.   Lymphadenopathy:        Head (right side): Preauricular (3 cm, fixed) adenopathy present.     He has no cervical adenopathy.     He has axillary adenopathy.        Right axillary: Lateral adenopathy present.   Neurological: He is alert and oriented to person, place, and time. He has normal reflexes. He exhibits normal muscle tone. Coordination normal.   Skin: Skin is warm and dry. No rash noted.        Psychiatric: He has a normal mood and affect. His behavior is normal.   Vitals reviewed.       Assessment:       1. Kidney cancer, primary, with metastasis from kidney to other site, unspecified laterality    2. Essential hypertension    3. Chronic pain of right knee        Plan:       1. Kidney cancer, primary, with metastasis from kidney to other site, unspecified laterality  Treated with non-conventional therapies  Clinical evidence suggests this is not efficacious  Discussed with the patient and his daughter.  They're not inclined to change their current course of therapy.  Suggested that oncological evaluation could be helpful either [facility] or through our organization.  It's unclear whether or not the right knee pain is secondary to osteoarthritis or possible other etiology.  Weight loss almost certainly a result of this cancer.    2. Essential hypertension  Controlled today  No new medications recommended    3. Chronic pain of right knee  Moist heat  We'll have one of my colleagues review the knee x-ray and likely kidney cancer consideration.  No medications added.    Forms completed for the [employer] and a parking permit.    A discussion was had with the patient and his daughter that his cancer does appear to be progressing, and I do anticipate additional consequences.    8/30/2017  I did speak with radiology.  While not absolutely clear that the lucent lesion in the medial condyle of the right leg is related to his kidney cancer, it certainly is well within the realm of possibility.  I will relay this to the patient and his family.    ROB

## 2017-09-29 DIAGNOSIS — I10 ESSENTIAL HYPERTENSION: Chronic | ICD-10-CM

## 2017-09-29 RX ORDER — LISINOPRIL 20 MG/1
TABLET ORAL
Qty: 30 TABLET | Refills: 0 | Status: SHIPPED | OUTPATIENT
Start: 2017-09-29

## 2017-09-29 NOTE — PROGRESS NOTES
Refill Authorization Note     is requesting a refill authorization.    Brief assessment and rational for refill: APPROVE: prr  Name of medication: LISINOPRIL 20 MG TABLET  How patient will take medication: t1t po daily   Amount/Quantity of medication ordered: 30d           Refills Authorized: Yes  If authorized number of refills: 0        Medication Therapy Plan: Pt needs updated CMP.  Orders in.  Needs to be scheduled.  Will approve for 30d   Comments:   Lab Results   Component Value Date    CREATININE 1.1 03/15/2016    BUN 14 03/15/2016     03/15/2016    K 4.7 03/15/2016     03/15/2016    CO2 31 (H) 03/15/2016      BP Readings from Last 3 Encounters:   08/29/17 118/70   08/07/17 (!) 158/83   06/02/17 (!) 141/83

## 2017-10-05 ENCOUNTER — TELEPHONE (OUTPATIENT)
Dept: FAMILY MEDICINE | Facility: CLINIC | Age: 77
End: 2017-10-05

## 2017-10-05 NOTE — TELEPHONE ENCOUNTER
----- Message from Yue Marin sent at 10/4/2017  4:48 PM CDT -----  Contact: [phone]/pt daughter demario bustos   [phone]/pt daughter demario bustos returning phone call

## 2017-10-06 ENCOUNTER — LAB VISIT (OUTPATIENT)
Dept: LAB | Facility: HOSPITAL | Age: 77
End: 2017-10-06
Attending: NURSE PRACTITIONER
Payer: MEDICARE

## 2017-10-06 DIAGNOSIS — I10 ESSENTIAL HYPERTENSION: Chronic | ICD-10-CM

## 2017-10-06 LAB
ALBUMIN SERPL BCP-MCNC: 2.9 G/DL
ALP SERPL-CCNC: 74 U/L
ALT SERPL W/O P-5'-P-CCNC: 8 U/L
ANION GAP SERPL CALC-SCNC: 6 MMOL/L
AST SERPL-CCNC: 16 U/L
BILIRUB SERPL-MCNC: 0.5 MG/DL
BUN SERPL-MCNC: 15 MG/DL
CALCIUM SERPL-MCNC: 9.6 MG/DL
CHLORIDE SERPL-SCNC: 106 MMOL/L
CO2 SERPL-SCNC: 30 MMOL/L
CREAT SERPL-MCNC: 0.9 MG/DL
EST. GFR  (AFRICAN AMERICAN): >60 ML/MIN/1.73 M^2
EST. GFR  (NON AFRICAN AMERICAN): >60 ML/MIN/1.73 M^2
GLUCOSE SERPL-MCNC: 85 MG/DL
POTASSIUM SERPL-SCNC: 5.1 MMOL/L
PROT SERPL-MCNC: 6.7 G/DL
SODIUM SERPL-SCNC: 142 MMOL/L

## 2017-10-06 PROCEDURE — 36415 COLL VENOUS BLD VENIPUNCTURE: CPT | Mod: PO

## 2017-10-06 PROCEDURE — 80053 COMPREHEN METABOLIC PANEL: CPT

## 2018-04-10 ENCOUNTER — TELEPHONE (OUTPATIENT)
Dept: FAMILY MEDICINE | Facility: CLINIC | Age: 78
End: 2018-04-10

## 2018-04-10 NOTE — TELEPHONE ENCOUNTER
[facility] appt offered for 2018. Pt's caregiver (daughter) declined appt and states [facility] is [facility] now.

## 2019-07-03 ENCOUNTER — TELEPHONE (OUTPATIENT)
Dept: FAMILY MEDICINE | Facility: CLINIC | Age: 79
End: 2019-07-03

## 2019-07-03 NOTE — TELEPHONE ENCOUNTER
Tried to call pt to make an appointment with Dr. Newman. He is  per person who answered the phone. I apologized and she said he  about a year ago.